# Patient Record
Sex: MALE | Race: WHITE
[De-identification: names, ages, dates, MRNs, and addresses within clinical notes are randomized per-mention and may not be internally consistent; named-entity substitution may affect disease eponyms.]

---

## 2020-01-31 ENCOUNTER — HOSPITAL ENCOUNTER (OUTPATIENT)
Dept: HOSPITAL 95 - ORSCMMR | Age: 75
Discharge: HOME | End: 2020-01-31
Attending: SURGERY
Payer: OTHER GOVERNMENT

## 2020-01-31 VITALS — WEIGHT: 170.64 LBS | BODY MASS INDEX: 23.89 KG/M2 | HEIGHT: 70.98 IN

## 2020-01-31 DIAGNOSIS — D12.2: Primary | ICD-10-CM

## 2020-01-31 DIAGNOSIS — R93.89: ICD-10-CM

## 2020-01-31 DIAGNOSIS — Z79.899: ICD-10-CM

## 2020-01-31 DIAGNOSIS — Z79.82: ICD-10-CM

## 2020-01-31 PROCEDURE — 0DBK8ZX EXCISION OF ASCENDING COLON, VIA NATURAL OR ARTIFICIAL OPENING ENDOSCOPIC, DIAGNOSTIC: ICD-10-PCS | Performed by: SURGERY

## 2020-01-31 PROCEDURE — 0DBP8ZX EXCISION OF RECTUM, VIA NATURAL OR ARTIFICIAL OPENING ENDOSCOPIC, DIAGNOSTIC: ICD-10-PCS | Performed by: SURGERY

## 2020-01-31 NOTE — NUR
History, Chart, Medications and Allergies reviewed before start of
procedure. Lungs clear T/O to Auscultation.
Patient confirms NPO status and agrees with scheduled surgery.
Pre-Op teaching done. Pt verbalizes understanding.
Patient states colon prep results clear.

## 2020-01-31 NOTE — NUR
01/31/20 1259 [address]
History, Chart, Medications and Allergies reviewed before start of
procedure.3-LEAD EKG REVIEWED WITH PHYSICIAN PRIOR TO START OF
PROCEDURE.O2 VIA N/C INTACT THROUGHOUT SEDATION/PROCEDURE. MONITOR
INTACT WITH CONTINUOUS PULSE OXIMETRY AND INTERMITTENT BP.PATIENT
DETERMINED TO BE ASA APPROPRIATE FOR PROPOFOL SEDATION PRIOR TO START
OF PROCEDURE BY .

## 2020-02-26 ENCOUNTER — HOSPITAL ENCOUNTER (OUTPATIENT)
Dept: HOSPITAL 95 - ORSCSDS | Age: 75
Discharge: HOME | End: 2020-02-26
Attending: SURGERY
Payer: OTHER GOVERNMENT

## 2020-02-26 VITALS — BODY MASS INDEX: 22.41 KG/M2 | WEIGHT: 160.06 LBS | HEIGHT: 70.98 IN

## 2020-02-26 DIAGNOSIS — C20: Primary | ICD-10-CM

## 2020-02-26 DIAGNOSIS — Z79.82: ICD-10-CM

## 2020-02-26 PROCEDURE — 05H533Z INSERTION OF INFUSION DEVICE INTO RIGHT SUBCLAVIAN VEIN, PERCUTANEOUS APPROACH: ICD-10-PCS | Performed by: SURGERY

## 2020-02-26 PROCEDURE — C1788 PORT, INDWELLING, IMP: HCPCS

## 2020-02-26 PROCEDURE — B5161ZA FLUOROSCOPY OF RIGHT SUBCLAVIAN VEIN USING LOW OSMOLAR CONTRAST, GUIDANCE: ICD-10-PCS | Performed by: SURGERY
